# Patient Record
Sex: MALE | Race: WHITE | Employment: FULL TIME | URBAN - METROPOLITAN AREA
[De-identification: names, ages, dates, MRNs, and addresses within clinical notes are randomized per-mention and may not be internally consistent; named-entity substitution may affect disease eponyms.]

---
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
## 2023-03-07 NOTE — PROGRESS NOTES
"  Assessment & Plan     Plantar warts    - DESTRUCT BENIGN LESION, UP TO 14    Home care discussed      No follow-ups on file.    [facility]    Arielle Malik is a 17 year old accompanied by his Self, presenting for the following health issues:  Plantar Wart      History of Present Illness       Reason for visit:  Wart    He eats 2-3 servings of fruits and vegetables daily.He consumes 2 sweetened beverage(s) daily.He exercises with enough effort to increase his heart rate 10 to 19 minutes per day.  He exercises with enough effort to increase his heart rate 5 days per week. He is missing 4 dose(s) of medications per week.  new patient to me.  Mom is with today.   Left foot only. Would like treated.   Has had them treated. They never went away.       Review of Systems   Constitutional, HEENT, cardiovascular, pulmonary, GI, , musculoskeletal, neuro, skin, endocrine and psych systems are negative, except as otherwise noted.      Objective    /83   Pulse 83   Temp 97.2  F (36.2  C) (Tympanic)   Resp 14   Ht 1.791 m (5' 10.5\")   Wt 84.8 kg (187 lb)   SpO2 99%   BMI 26.45 kg/m    Body mass index is 26.45 kg/m .  Physical Exam   GENERAL: healthy, alert and no distress  RESP: lungs clear to auscultation - no rales, rhonchi or wheezes  CV: regular rate and rhythm,  MS: no gross musculoskeletal defects noted, no edema  NEURO: Normal strength and tone, mentation intact and speech normal  PSYCH: mentation appears normal, affect normal/bright  SKIN:   Location: left foot there are  a total of  13  medium-sized skin colored cauliflower textured lesions that are consistent with warts.     Procedure: warts were treated with liquid nitrogen cryotherapy x 3.  Patient tolerated well with minimal discomfort.  Discusses signs and symptoms to monitor for including redness, pain, or other signs of infection.  Continue to use OTC topicals after two days.  Return in 2 " weeks for re-treatment.

## 2023-03-09 ENCOUNTER — OFFICE VISIT (OUTPATIENT)
Dept: FAMILY MEDICINE | Facility: CLINIC | Age: 18
End: 2023-03-09
Payer: COMMERCIAL

## 2023-03-09 VITALS
RESPIRATION RATE: 14 BRPM | HEIGHT: 71 IN | OXYGEN SATURATION: 99 % | BODY MASS INDEX: 26.18 KG/M2 | WEIGHT: 187 LBS | DIASTOLIC BLOOD PRESSURE: 83 MMHG | HEART RATE: 83 BPM | SYSTOLIC BLOOD PRESSURE: 128 MMHG | TEMPERATURE: 97.2 F

## 2023-03-09 DIAGNOSIS — B07.0 PLANTAR WARTS: Primary | ICD-10-CM

## 2023-03-09 PROCEDURE — 17110 DESTRUCTION B9 LES UP TO 14: CPT | Performed by: PHYSICIAN ASSISTANT

## 2023-03-09 ASSESSMENT — PAIN SCALES - GENERAL: PAINLEVEL: NO PAIN (0)

## 2023-04-23 ENCOUNTER — HEALTH MAINTENANCE LETTER (OUTPATIENT)
Age: 18
End: 2023-04-23

## 2023-07-11 NOTE — TELEPHONE ENCOUNTER
I spoke to Mother, kristina, and discussed injury and how yogesh is doing. He is doing well, ROM is increasing everyday. Still some discomfort. I asked that he avoid weight bearing and continue gentle ROM. This will take several weeks to start feeling better. I asked that he give it a couple weeks unless symptoms turn for the worst and he should come in.    /David Sher PA-C, CAQ (Ortho)  Supervising Physician: Eros Walker M.D., M.S.  Dept. of Orthopaedic Surgery  [facility]    
Quality 130: Documentation Of Current Medications In The Medical Record: Current Medications Documented
Reason for Call:  Other appointment    Detailed comments: Patient's mother calling, states the symptoms haven't gotten worse but they haven't gotten any better. They would like to know what to do next. Please call back to advise.    Phone Number Patient can be reached at: Cell number on file:    Telephone Information:   Mobile [phone]       Best Time: any    Can we leave a detailed message on this number? YES    Call taken on 4/28/2020 at 11:53 AM by Lexa Hewitt      
Reason for call:  Other   Patient called regarding (reason for call): call back  Additional comments: Patients mom is calling because she says her son's elbow is not getting any better, he hurt it about a week ago. Please call back to discuss    Phone number to reach patient:  Cell number on file:    Telephone Information:   Mobile [phone]       Best Time:  any    Can we leave a detailed message on this number?  YES    Travel screening: Not Applicable  
Quality 431: Preventive Care And Screening: Unhealthy Alcohol Use - Screening: Patient not identified as an unhealthy alcohol user when screened for unhealthy alcohol use using a systematic screening method
Detail Level: Detailed
Quality 226: Preventive Care And Screening: Tobacco Use: Screening And Cessation Intervention: Tobacco Screening not Performed

## 2023-10-05 ENCOUNTER — MYC REFILL (OUTPATIENT)
Dept: FAMILY MEDICINE | Facility: CLINIC | Age: 18
End: 2023-10-05
Payer: COMMERCIAL

## 2023-10-05 DIAGNOSIS — F98.8 ATTENTION DEFICIT DISORDER (ADD) WITHOUT HYPERACTIVITY: ICD-10-CM

## 2023-10-06 RX ORDER — DEXTROAMPHETAMINE SACCHARATE, AMPHETAMINE ASPARTATE MONOHYDRATE, DEXTROAMPHETAMINE SULFATE AND AMPHETAMINE SULFATE 3.75; 3.75; 3.75; 3.75 MG/1; MG/1; MG/1; MG/1
15 CAPSULE, EXTENDED RELEASE ORAL DAILY
Qty: 30 CAPSULE | Refills: 0 | OUTPATIENT
Start: 2023-10-06

## 2024-01-15 ENCOUNTER — VIRTUAL VISIT (OUTPATIENT)
Dept: FAMILY MEDICINE | Facility: CLINIC | Age: 19
End: 2024-01-15
Payer: COMMERCIAL

## 2024-01-15 DIAGNOSIS — F98.8 ATTENTION DEFICIT DISORDER (ADD) WITHOUT HYPERACTIVITY: ICD-10-CM

## 2024-01-15 PROCEDURE — 99213 OFFICE O/P EST LOW 20 MIN: CPT | Mod: 95 | Performed by: STUDENT IN AN ORGANIZED HEALTH CARE EDUCATION/TRAINING PROGRAM

## 2024-01-15 NOTE — PATIENT INSTRUCTIONS
Rio Malik,    Thank you for allowing [facility] to manage your care.    I or please go to the laboratory to get your laboratory studies.    For your convenience, test results are released as soon as they are available  Please allow 1-2 business days for me to send you a comment about your results.  If not done so, I encourage you to login into [employer] ([email or web address] to review your results in real time.     If you have any questions or concerns, please feel free to call us at [phone].    Sincerely,    Dr. Connolly    Did you know?      You can schedule a video visit for follow-up appointments as well as future appointments for certain conditions.  Please see the below link.     [email or web address]    If you have not already done so,  I encourage you to sign up for [employer] ([email or web address]).  This will allow you to review your results, securely communicate with a provider, and schedule virtual visits as well.

## 2024-01-15 NOTE — PROGRESS NOTES
King is a 18 year old who is being evaluated via a billable video visit.      How would you like to obtain your AVS? GengoharRed Mapache  If the video visit is dropped, the invitation should be resent by: Text to cell phone: [phone]  Will anyone else be joining your video visit? No          Assessment & Plan     1. Attention deficit disorder (ADD) without hyperactivity  Uncontrolled. I advised patient to come to the clinic to sign CSA. His medication will be sent to the pharmacy once the CSA is signed and UDS is clean.  - Urine Drug Screen Clinic; Future  He will need to be seen every 3 months  Angelica Connolly MD  [facility] [place] Malik is a 18 year old, presenting for the following health issues:  A.D.H.D        1/15/2024    10:07 AM   Additional Questions   Roomed by Patient completed echeck in via mSpot       DAE    History of Present Illness       Reason for visit:  Med check for adderall 15mg    He eats 2-3 servings of fruits and vegetables daily.He consumes 2 sweetened beverage(s) daily.He exercises with enough effort to increase his heart rate 20 to 29 minutes per day.  He exercises with enough effort to increase his heart rate 5 days per week. He is missing 2 dose(s) of medications per week.  He is not taking prescribed medications regularly due to other.         Never Rarely Sometimes Often Very Often   1 How often do you have trouble wrapping up the final details of a project once the challenging parts have been done?   x     2 How often do you have difficulty getting things in order when you have to do a task that requires organization?   x     3 How often do you have problems remembering appointments or obligations?  x      4 When you have a task that requires a lot of thought, how often do you avoid or delay getting started?    x    5 How often do you fidget or squirm with your hands or feet when you have to sit down for a long time?     x   6 How often do you feel  overly active and compelled to do things, like you were driven by a motor? x       7 How often do you make careless mistakes when you have to work on a boring or difficult project?  x      8 How often do you have difficulty keeping your attention when you are doing boring or repetitive work?   x     9 How often do you have difficulty concentrating on what people say to you, even when they are speaking to you directly?    x    10 How often do you misplace or have difficulty finding things at home or at work?     x   11 How often are you distracted by activity or noise around you?     x   12 How often do you leave your seat in meetings or other situations in which you are expected to remain seated?  x      13 How often do you feel restless or fidgety?    x    14 How often do you have difficulty unwinding and relaxing when you have time to yourself?   x     15 How often do you find yourself talking too much when you are in social situations?   x     16 When you're in a conversation, how often do you find yourself finishing the sentences of the people you are talking to, before they can finish them themselves?  x      17 How often do you have difficulty waiting your turn in situations when turn-taking is required?   x     18 How often do you interrupt others when they are busy?    x       Patient was getting medication for his pediatrician at [facility] who as since then retired. He last used it last year May. He denies any side effects or cardiac history.        Review of Systems   Constitutional, HEENT, cardiovascular, pulmonary, gi and gu systems are negative, except as otherwise noted.      Objective           Vitals:  No vitals were obtained today due to virtual visit.    Physical Exam   GENERAL: Healthy, alert and no distress  EYES: Eyes grossly normal to inspection.  No discharge or erythema, or obvious scleral/conjunctival abnormalities.  RESP: No audible wheeze, cough, or visible cyanosis.  No visible  retractions or increased work of breathing.    SKIN: Visible skin clear. No significant rash, abnormal pigmentation or lesions.  PSYCH: Mentation appears normal, affect normal/bright, judgement and insight intact, normal speech and appearance well-groomed.        Video-Visit Details    Type of service:  Video Visit     Originating Location (pt. Location): Home    Distant Location (provider location):  On-site  Platform used for Video Visit: [employer]

## 2024-01-15 NOTE — LETTER
[facility]  01/15/24  Patient: Lexa Aldridge  YOB: 2005  Medical Record Number: [id number]                                                                                  Non-Opioid Controlled Substance Agreement    This is an agreement between you and your provider regarding safe and appropriate use of controlled substances prescribed by your care team. Controlled substances are?medicines that can cause physical and mental dependence (abuse).     There are strict laws about having and using these medicines. We here at [facility] are  committed to working with you in your efforts to get better. To support you in this work, we'll help you schedule regular office appointments for medicine refills. If we must cancel or change your appointment for any reason, we'll make sure you have enough medicine to last until your next appointment.     As a Provider, I will:   Listen carefully to your concerns while treating you with respect.   Recommend a treatment plan that I believe is in your best interest and may involve therapies other than medicine.    Talk with you often about the possible benefits and the risk of harm of any medicine that we prescribe for you.  Assess the safety of this medicine and check how well it works.    Provide a plan on how to taper (discontinue or go off) using this medicine if the decision is made to stop its use.      ::  As a Patient, I understand controlled substances:     Are prescribed by my care provider to help me function or work and manage my condition(s).?  Are strong medicines and can cause serious side effects.     Need to be taken exactly as prescribed.?Combining controlled substances with certain medicines or chemicals (such as illegal drugs, alcohol, sedatives, sleeping pills, and benzodiazepines) can be dangerous or even fatal.? If I stop taking my medicines suddenly, I may have severe withdrawal symptoms.     The risks, benefits, and  side effects of these medicine(s) were explained to me. I agree that:    I will take part in other treatments as advised by my care team. This may be psychiatry or counseling, physical therapy, behavioral therapy, group treatment or a referral to specialist.    I will keep all my appointments and understand this is part of the monitoring of controlled substances.?My care team may require an office visit for EVERY controlled substance refill. If I miss appointments or don t follow instructions, my care team may stop my medicine    I will take my medicines as prescribed. I will not change the dose or schedule unless my care team tells me to. There will be no refills if I run out early.      I may be asked to come to the clinic and complete a urine drug test or complete a pill count. If I don t give a urine sample or participate in a pill count, the care team may stop my medicine.    I will only receive controlled substance prescriptions from this clinic. If I am treated by another provider, I will tell them that I am taking controlled substances and that I have a treatment agreement with this provider. I will inform my [facility] care team within one business day if I am given a prescription for any controlled substance by another healthcare provider. My [facility] care team can contact other providers and pharmacists about my use of any medicines.    It is up to me to make sure that I don't run out of my medicines on weekends or holidays.?If my care team is willing to refill my prescription without a visit, I must request refills only during office hours. Refills may take up to 3 business days to process. I will use one pharmacy to fill all my controlled substance prescriptions. I will notify the clinic about any changes to my insurance or medicine availability.    I am responsible for my prescriptions. If the medicine/prescription is lost, stolen or destroyed, it will not be replaced.?I also agree not  to share controlled substance medicines with anyone.     I am aware I should not use any illegal or recreational drugs. I agree not to drink alcohol unless my care team says I can.     If I enroll in the [place] Medical Cannabis program, I will tell my care team before my next refill.    I will tell my care team right away if I become pregnant, have a new medical problem treated outside of my regular clinic, or have a change in my medicines.     I understand that this medicine can affect my thinking, judgment and reaction time.? Alcohol and drugs affect the brain and body, which can affect the safety of my driving. Being under the influence of alcohol or drugs can affect my decision-making, behaviors, personal safety and the safety of others. Driving while impaired (DWI) can occur if a person is driving, operating or in physical control of a car, motorcycle, boat, snowmobile, ATV, motorbike, off-road vehicle or any other motor vehicle ([place] Statute 169A.20). I understand the risk if I choose to drive or operate any vehicle or machinery.    I understand that if I do not follow any of the conditions above, my prescriptions or treatment may be stopped or changed.   I agree that my provider, clinic care team and pharmacy may work with any city, state or federal law enforcement agency that investigates the misuse, sale or other diversion of my controlled medicine. I will allow my provider to discuss my care with, or share a copy of, this agreement with any other treating provider, pharmacy or emergency room where I receive care.     I have read this agreement and have asked questions about anything I did not understand.    ________________________________________________________  Patient Signature - Lexa Aldridge     ___________________                   Date     ________________________________________________________  Provider Signature - Angelica Connolly MD       ___________________                   Date      ________________________________________________________  Witness Signature (required if provider not present while patient signing)          ___________________                   Date

## 2024-06-30 ENCOUNTER — HEALTH MAINTENANCE LETTER (OUTPATIENT)
Age: 19
End: 2024-06-30

## 2025-06-26 ENCOUNTER — OFFICE VISIT (OUTPATIENT)
Dept: URGENT CARE | Facility: URGENT CARE | Age: 20
End: 2025-06-26
Payer: COMMERCIAL

## 2025-06-26 VITALS
BODY MASS INDEX: 27.2 KG/M2 | SYSTOLIC BLOOD PRESSURE: 127 MMHG | TEMPERATURE: 97.9 F | WEIGHT: 190 LBS | OXYGEN SATURATION: 98 % | RESPIRATION RATE: 16 BRPM | HEIGHT: 70 IN | DIASTOLIC BLOOD PRESSURE: 79 MMHG | HEART RATE: 81 BPM

## 2025-06-26 DIAGNOSIS — S09.90XA CLOSED HEAD INJURY, INITIAL ENCOUNTER: Primary | ICD-10-CM

## 2025-06-26 NOTE — PROGRESS NOTES
"SUBJECTIVE:  Chief Complaint   Patient presents with    Urgent Care    Head Injury     Last night hit head against someone else head     Headache     Headache   Yesterday had light sensitivity     Nausea     Nausea   Worse when talking      Lexa Aldridge is a 19 year old adult presents with a chief complaint hEAD INJURY YESTERDAY. NO LOC, no persisting headache or neuro symptoms  Bumped heads with another student, performing a pratfall into a bean bag.   WEntr to work today and had some issues with depth perception.      No past medical history on file.  Current Outpatient Medications   Medication Sig Dispense Refill    amphetamine-dextroamphetamine (ADDERALL XR) 15 MG 24 hr capsule Take 1 capsule (15 mg) by mouth daily - LAST REFILL (Patient not taking: Reported on 6/26/2025) 30 capsule 0    Pediatric Multivit-Minerals-C (FLINSTONES GUMMIES) CHEW 2 gummies daily or as remember. (Patient not taking: Reported on 6/26/2025)       Social History     Tobacco Use    Smoking status: Never    Smokeless tobacco: Never   Substance Use Topics    Alcohol use: No       ROS:  Review of systems negative except as stated above.    EXAM:   /79   Pulse 81   Temp 97.9  F (36.6  C) (Temporal)   Resp 16   Ht 1.778 m (5' 10\")   Wt 86.2 kg (190 lb)   SpO2 98%   BMI 27.26 kg/m    Gen: healthy,alert,no distress  SKULL: small bruise parietal. No stepoffs or crepitus  GENERAL APPEARANCE: healthy, alert and no distress  CHEST: clear to auscultation  CV: regular rate and rhythm  SKIN: no suspicious lesions or rashes  NEURO: Normal strength and tone, sensory exam grossly normal, mentation intact and speech normal      ASSESSMENT:   (S09.90XA) Closed head injury, initial encounter  (primary encounter diagnosis)  Comment: withou t red flags  Plan: Concussion  Referral        Monitored by friend next 12 hours  Rest 3-4 days  Follow up prn  Red flags and emergent follow up discussed, and understood by patient  Follow up " with PCP if symptoms worsen or fail to improve

## 2025-06-26 NOTE — PROGRESS NOTES
Urgent Care Clinic Visit    Chief Complaint   Patient presents with    Urgent Care    Head Injury     Last night hit head against someone else head     Headache     Headache   Yesterday had light sensitivity     Nausea     Nausea   Worse when talking                6/26/2025     4:06 PM   Additional Questions   Roomed by elias elizalde         6/26/2025     4:06 PM   Patient Reported Additional Medications   Patient reports taking the following new medications estradiol (ESTRACE) 4 MG tablet spironolactone (ALDACTONE) 100 MG tablet

## 2025-06-26 NOTE — LETTER
June 26, 2025      Lexa Larahaleyjefry  [id number] [facility] [id number]        To Whom It May Concern:    Lexa Aldridge  was seen on 6/26.  Please excuse her until 7/1 due to injury.        Sincerely,        Alonzo Tavera PA-C    Electronically signed

## 2025-06-26 NOTE — LETTER
June 26, 2025      Lexa J Luis Carlos  [id number] [facility] [id number]        To Whom It May Concern:    Lexa AGUILAR Luis Carlos  was seen on 6/26.  Please excuse absences until 7/1 due to injury.        Sincerely,        Alonzo Tavera PA-C    Electronically signed

## 2025-07-13 ENCOUNTER — HEALTH MAINTENANCE LETTER (OUTPATIENT)
Age: 20
End: 2025-07-13